# Patient Record
Sex: FEMALE | Race: WHITE | NOT HISPANIC OR LATINO | URBAN - NONMETROPOLITAN AREA
[De-identification: names, ages, dates, MRNs, and addresses within clinical notes are randomized per-mention and may not be internally consistent; named-entity substitution may affect disease eponyms.]

---

## 2021-03-29 ENCOUNTER — IMPORTED ENCOUNTER (OUTPATIENT)
Dept: URBAN - NONMETROPOLITAN AREA CLINIC 1 | Facility: CLINIC | Age: 51
End: 2021-03-29

## 2021-03-29 PROBLEM — H52.13: Noted: 2021-03-29

## 2021-03-29 PROBLEM — H52.223: Noted: 2021-03-29

## 2021-03-29 PROBLEM — H52.4: Noted: 2021-03-29

## 2021-03-29 PROCEDURE — 92310 CONTACT LENS FITTING OU: CPT

## 2021-03-29 PROCEDURE — 92004 COMPRE OPH EXAM NEW PT 1/>: CPT

## 2021-03-29 PROCEDURE — 92015 DETERMINE REFRACTIVE STATE: CPT

## 2021-03-29 PROCEDURE — V2521 CNTCT LENS HYDROPHILIC TORIC: HCPCS

## 2021-03-29 NOTE — PATIENT DISCUSSION
Myopia/Astigmatism/Presbyopia OUDiscussed refractive status in detail with patient. New glasses and contact Rx given today. Gave patient trials for mono vision but explained since she was dilated to wait until tomorrow to try them. Discussed proper care replacement and hygiene. Continue to monitor.

## 2022-04-04 ENCOUNTER — COMPREHENSIVE EXAM (OUTPATIENT)
Dept: URBAN - NONMETROPOLITAN AREA CLINIC 1 | Facility: CLINIC | Age: 52
End: 2022-04-04

## 2022-04-04 DIAGNOSIS — H52.13: ICD-10-CM

## 2022-04-04 PROCEDURE — 92014 COMPRE OPH EXAM EST PT 1/>: CPT

## 2022-04-04 PROCEDURE — 92310-E CONTACT LENS FITTING ESTABLISH PATIENT

## 2022-04-04 PROCEDURE — 92015 DETERMINE REFRACTIVE STATE: CPT

## 2022-04-04 ASSESSMENT — TONOMETRY
OS_IOP_MMHG: 14
OD_IOP_MMHG: 14

## 2022-04-04 ASSESSMENT — VISUAL ACUITY
OD_CC: 20/25-1
OS_CC: 20/20-1

## 2022-04-04 NOTE — PATIENT DISCUSSION
Patient given Rx for glasses and contacts. Discussed proper care, replacement and hygiene. Continue to monitor.

## 2022-04-09 ASSESSMENT — TONOMETRY
OD_IOP_MMHG: 12
OS_IOP_MMHG: 12

## 2022-04-09 ASSESSMENT — VISUAL ACUITY
OD_SC: 20/30
OS_SC: 20/30

## 2023-04-13 ENCOUNTER — ESTABLISHED PATIENT (OUTPATIENT)
Dept: URBAN - NONMETROPOLITAN AREA CLINIC 1 | Facility: CLINIC | Age: 53
End: 2023-04-13

## 2023-04-13 DIAGNOSIS — H52.13: ICD-10-CM

## 2023-04-13 PROCEDURE — 92310-E CONTACT LENS FITTING ESTABLISH PATIENT

## 2023-04-13 PROCEDURE — 92014 COMPRE OPH EXAM EST PT 1/>: CPT

## 2023-04-13 PROCEDURE — 92015 DETERMINE REFRACTIVE STATE: CPT

## 2023-04-13 ASSESSMENT — VISUAL ACUITY
OD_CC: 20/20
OS_CC: 20/20

## 2023-04-13 ASSESSMENT — TONOMETRY
OS_IOP_MMHG: 16
OD_IOP_MMHG: 16

## 2024-04-15 ENCOUNTER — ESTABLISHED PATIENT (OUTPATIENT)
Dept: URBAN - NONMETROPOLITAN AREA CLINIC 1 | Facility: CLINIC | Age: 54
End: 2024-04-15

## 2024-04-15 DIAGNOSIS — H52.13: ICD-10-CM

## 2024-04-15 PROCEDURE — 92015 DETERMINE REFRACTIVE STATE: CPT

## 2024-04-15 PROCEDURE — 92310-E CONTACT LENS FITTING ESTABLISH PATIENT

## 2024-04-15 PROCEDURE — 92014 COMPRE OPH EXAM EST PT 1/>: CPT

## 2024-04-15 ASSESSMENT — VISUAL ACUITY
OU_CC: 20/20
OD_CC: 20/20
OS_CC: 20/20

## 2024-04-15 ASSESSMENT — TONOMETRY
OS_IOP_MMHG: 12
OD_IOP_MMHG: 12

## 2025-04-21 ENCOUNTER — COMPREHENSIVE EXAM (OUTPATIENT)
Age: 55
End: 2025-04-21

## 2025-04-21 DIAGNOSIS — H52.13: ICD-10-CM

## 2025-04-21 DIAGNOSIS — H52.4: ICD-10-CM

## 2025-04-21 DIAGNOSIS — H52.223: ICD-10-CM

## 2025-04-21 PROCEDURE — 92014 COMPRE OPH EXAM EST PT 1/>: CPT

## 2025-04-21 PROCEDURE — 92310-3 LEVEL 3 SOFT LENS UPDATE

## 2025-04-21 PROCEDURE — 92015 DETERMINE REFRACTIVE STATE: CPT
